# Patient Record
(demographics unavailable — no encounter records)

---

## 2025-05-23 NOTE — DISCUSSION/SUMMARY
[de-identified] : Left shoulder/scapula pain  HPI Patient is a 13-year-old male accompanied by his mother reports to the office for evaluation of his left shoulder/scapula pain since Wednesday, 5/21/2025.  He states that he was playing football and attempted a tackle on a larger opponent.  This caused him pain in his left shoulder/scapular region after the impact.  Since then, lifting, range of motion, and palpating certain areas aggravate the patient's pain.  He is right-hand dominant.  He points to the scapular region when asked where the worst pain is.  Denies any numbness or tingling.  Left shoulder and scapula x-rays taken in office today revealed a questionable nondisplaced scapular body fracture.  Open growth plates noted.  No dislocation.  Otherwise, no other significant abnormalities were seen.  Left shoulder examination is as follows: No swelling noted.  No erythema or ecchymosis.  TTP over scapular region.  Active forward flexion/abduction to approximately 170 degrees, passive forward flexion/abduction to approximate 180 degrees with stiffness and pain.  Internal rotation to L1 and external rotation to 90 degrees with stiffness and pain.  There is decent strength with discomfort.  Mild pain with speeds and [place]'s testing.  Light touch intact throughout.  Assessment/plan Explained questionable fracture on x-ray.  Patient was provided with a sling.  Explained that he should keep the sling on at all times including during sleep.  He may take the sling on and off for hygiene purposes only.  Patient and mother understand and will comply.  He may take children's Tylenol or Motrin as needed for pain.  The patient was advised to rest/ice the area and may alternate with warm compresses as needed.  Instructed not to perform any strenuous activity that may worsen symptoms.  I advised no gym or sports at least until MRI results.  Left shoulder MRI ordered for further evaluation.  Patient was advised to call the office a few days after getting the MRI done to discuss results over the phone.  Follow-up in 2 weeks for further evaluation and possible repeat x-rays.  All question/concerns were answered in detail.